# Patient Record
Sex: MALE | Race: WHITE | Employment: FULL TIME | URBAN - METROPOLITAN AREA
[De-identification: names, ages, dates, MRNs, and addresses within clinical notes are randomized per-mention and may not be internally consistent; named-entity substitution may affect disease eponyms.]

---

## 2019-05-18 ENCOUNTER — APPOINTMENT (OUTPATIENT)
Dept: GENERAL RADIOLOGY | Age: 24
End: 2019-05-18
Payer: COMMERCIAL

## 2019-05-18 ENCOUNTER — HOSPITAL ENCOUNTER (EMERGENCY)
Age: 24
Discharge: HOME | End: 2019-05-18
Attending: EMERGENCY MEDICINE
Payer: COMMERCIAL

## 2019-05-18 VITALS
HEART RATE: 68 BPM | RESPIRATION RATE: 18 BRPM | HEIGHT: 68 IN | TEMPERATURE: 98.7 F | DIASTOLIC BLOOD PRESSURE: 76 MMHG | WEIGHT: 155 LBS | OXYGEN SATURATION: 100 % | SYSTOLIC BLOOD PRESSURE: 131 MMHG | BODY MASS INDEX: 23.49 KG/M2

## 2019-05-18 DIAGNOSIS — R55 VASOVAGAL SYNCOPE: Primary | ICD-10-CM

## 2019-05-18 LAB
A/G RATIO: 1.4 (ref 1.1–2.2)
ALBUMIN SERPL-MCNC: 4.3 G/DL (ref 3.4–5)
ALP BLD-CCNC: 111 U/L (ref 40–129)
ALT SERPL-CCNC: <5 U/L (ref 10–40)
ANION GAP SERPL CALCULATED.3IONS-SCNC: 13 MMOL/L (ref 3–16)
AST SERPL-CCNC: 24 U/L (ref 15–37)
BASOPHILS ABSOLUTE: 0 K/UL (ref 0–0.2)
BASOPHILS RELATIVE PERCENT: 0.3 %
BILIRUB SERPL-MCNC: <0.2 MG/DL (ref 0–1)
BUN BLDV-MCNC: 8 MG/DL (ref 7–20)
CALCIUM SERPL-MCNC: 9 MG/DL (ref 8.3–10.6)
CHLORIDE BLD-SCNC: 99 MMOL/L (ref 99–110)
CO2: 26 MMOL/L (ref 21–32)
CREAT SERPL-MCNC: 0.8 MG/DL (ref 0.9–1.3)
EOSINOPHILS ABSOLUTE: 1 K/UL (ref 0–0.6)
EOSINOPHILS RELATIVE PERCENT: 9.4 %
GFR AFRICAN AMERICAN: >60
GFR NON-AFRICAN AMERICAN: >60
GLOBULIN: 3 G/DL
GLUCOSE BLD-MCNC: 135 MG/DL (ref 70–99)
HCT VFR BLD CALC: 43.6 % (ref 40.5–52.5)
HEMOGLOBIN: 15.4 G/DL (ref 13.5–17.5)
LYMPHOCYTES ABSOLUTE: 1.3 K/UL (ref 1–5.1)
LYMPHOCYTES RELATIVE PERCENT: 12.2 %
MCH RBC QN AUTO: 31 PG (ref 26–34)
MCHC RBC AUTO-ENTMCNC: 35.4 G/DL (ref 31–36)
MCV RBC AUTO: 87.6 FL (ref 80–100)
MONOCYTES ABSOLUTE: 0.7 K/UL (ref 0–1.3)
MONOCYTES RELATIVE PERCENT: 6.8 %
NEUTROPHILS ABSOLUTE: 7.8 K/UL (ref 1.7–7.7)
NEUTROPHILS RELATIVE PERCENT: 71.3 %
PDW BLD-RTO: 12.6 % (ref 12.4–15.4)
PLATELET # BLD: 332 K/UL (ref 135–450)
PMV BLD AUTO: 8.3 FL (ref 5–10.5)
POTASSIUM REFLEX MAGNESIUM: 4 MMOL/L (ref 3.5–5.1)
RBC # BLD: 4.97 M/UL (ref 4.2–5.9)
SODIUM BLD-SCNC: 138 MMOL/L (ref 136–145)
TOTAL PROTEIN: 7.3 G/DL (ref 6.4–8.2)
TROPONIN: <0.01 NG/ML
WBC # BLD: 11 K/UL (ref 4–11)

## 2019-05-18 PROCEDURE — 84484 ASSAY OF TROPONIN QUANT: CPT

## 2019-05-18 PROCEDURE — 93005 ELECTROCARDIOGRAM TRACING: CPT | Performed by: PHYSICIAN ASSISTANT

## 2019-05-18 PROCEDURE — 71046 X-RAY EXAM CHEST 2 VIEWS: CPT

## 2019-05-18 PROCEDURE — 85025 COMPLETE CBC W/AUTO DIFF WBC: CPT

## 2019-05-18 PROCEDURE — 80053 COMPREHEN METABOLIC PANEL: CPT

## 2019-05-18 PROCEDURE — 99284 EMERGENCY DEPT VISIT MOD MDM: CPT

## 2019-05-18 RX ORDER — HYDROXYZINE HYDROCHLORIDE 25 MG/1
25 TABLET, FILM COATED ORAL NIGHTLY
COMMUNITY

## 2019-05-18 ASSESSMENT — ENCOUNTER SYMPTOMS
NAUSEA: 0
SHORTNESS OF BREATH: 0
CHEST TIGHTNESS: 0
SORE THROAT: 0
SINUS PRESSURE: 0
COUGH: 0
ABDOMINAL PAIN: 0
VOMITING: 0

## 2019-05-18 NOTE — ED NOTES
Bed: 515  Expected date:   Expected time:   Means of arrival:   Comments:  Hans Rajput RN  05/18/19 [id number]

## 2019-05-18 NOTE — ED PROVIDER NOTES
[address] ENCOUNTER          PHYSICIAN ASSISTANT NOTE       Date of evaluation: 2019    Chief Complaint     Loss of Consciousness      History of Present Illness     Todd Andre is a 21 y.o. male with a history of anxiety, asthma, and eczema presents today with a syncopal episode. Patient states she was sitting on the couch watching when he began having an aching pain in his left upper quadrant. He initially thought it was gas pains. As he was walking he began feeling sweaty and turned \"white as a sheet\". He went to tell his mom he was feeling unwell and apparently when he got into her bedroom nearly slumped to the floor. His mom lowered him to the ground he did not strike his head. During this episode he does not endorse any chest pain, shortness of breath. He states he felt tingling along the left side of his body and was very anxious about the situation. He has a history of anxiety attacks but never  symptoms like this. They called EMS and when EMS arrived he states he had felt much improved, but when he went to stand he felt like he was about to pass out again. He denies family history of early age heart attacks or cardiac issues. He is an active young man and has never passed out during exercise or activity. He does state he was treated with IM Decadron, anabolic eyedrops, and oral antibiotics for a right eye infection yesterday. He states his eye is no longer swollen, does not hurt, does not endorse any discharge. He denies any headache, difficulty moving his eye, fever, chills, sweats. Review of Systems     Review of Systems   Constitutional: Positive for chills. Negative for fever. HENT: Negative for sinus pressure and sore throat. Eyes: Negative for visual disturbance. Respiratory: Negative for cough, chest tightness and shortness of breath. Cardiovascular: Negative for chest pain and palpitations.    Gastrointestinal: Negative for abdominal pain, nausea and vomiting. Genitourinary: Negative for dysuria, flank pain and hematuria. Musculoskeletal: Negative for myalgias and neck pain. Skin: Negative for rash. Neurological: Positive for syncope and numbness. Negative for dizziness, light-headedness and headaches. Psychiatric/Behavioral: The patient is not nervous/anxious. Past Medical, Surgical, Family, and Social History     He has no past medical history on file. He has a past surgical history that includes fracture surgery and hernia repair. His family history is not on file. He reports that he has never smoked. He does not have any smokeless tobacco history on file. He reports that he drinks alcohol. He reports that he does not use drugs. Medications     Discharge Medication List as of 5/18/2019  9:09 PM      CONTINUE these medications which have NOT CHANGED    Details   hydrOXYzine (ATARAX) 25 MG tablet Take 25 mg by mouth nightly Two tabs Historical Med             Allergies     He has No Known Allergies. Physical Exam     INITIAL VITALS: BP: 131/76, Temp: 98.7 °F (37.1 °C), Pulse: 68, Resp: 18, SpO2: 100 %     General: Well appearing, well nourished, in no apparent state of distress. HEENT:  Normocephalic, atraumatic. Pupils equal, sclera white. Handling secretions without difficulty. EOM grossly intact. Neck: No meningismus. Trachea midline    Pulmonary: Respirations even. Non labored. No tachypnea. Good air movement throughout    Cardiac: Chest symmetrical and non-tender on palpation of chest wall. RRR. no M/R/G. Abdomen:  Non-distended. Bowel sounds present in all quadrants. Non rigid and non tender to palpation. Musculoskeletal:  Ambulates under own control. Atraumatic exam with no focal swelling or tenderness. No peripheral edema. Strength 5/5 in all four extremities. Neuro: A&O x4. GCS 15. CN II-XII grossly intact. Sensation to light and sharp touch intact throughout. Normal unlabored gait. reflex Magnesium 4.0 3.5 - 5.1 mmol/L    Chloride 99 99 - 110 mmol/L    CO2 26 21 - 32 mmol/L    Anion Gap 13 3 - 16    Glucose 135 (H) 70 - 99 mg/dL    BUN 8 7 - 20 mg/dL    CREATININE 0.8 (L) 0.9 - 1.3 mg/dL    GFR Non-African American >60 >60    GFR African American >60 >60    Calcium 9.0 8.3 - 10.6 mg/dL    Total Protein 7.3 6.4 - 8.2 g/dL    Alb 4.3 3.4 - 5.0 g/dL    Albumin/Globulin Ratio 1.4 1.1 - 2.2    Total Bilirubin <0.2 0.0 - 1.0 mg/dL    Alkaline Phosphatase 111 40 - 129 U/L    ALT <5 (L) 10 - 40 U/L    AST 24 15 - 37 U/L    Globulin 3.0 g/dL   Troponin   Result Value Ref Range    Troponin <0.01 <0.01 ng/mL   EKG 12 Lead   Result Value Ref Range    Ventricular Rate 66 BPM    Atrial Rate 66 BPM    P-R Interval 116 ms    QRS Duration 98 ms    Q-T Interval 402 ms    QTc Calculation (Bazett) 421 ms    P Axis 20 degrees    R Axis 70 degrees    T Axis 46 degrees    Diagnosis       EKG performed in ER and to be interpreted by ER physician. Confirmed by MD, ER [id number]),  [address] ([phone]) on 5/19/2019 8:59:29 AM       ED BEDSIDE ULTRASOUND:      RECENT VITALS:  BP: 131/76, Temp: 98.7 °F (37.1 °C), Pulse: 68, Resp: 18, SpO2: 100 %     Procedures         ED Course     Nursing Notes, Past Medical Hx, Past Surgical Hx, Social Hx, Allergies, and Family Hx were reviewed. The patient was given the following medications:  No orders of the defined types were placed in this encounter. CONSULTS:  IP CONSULT TO PRIMARY CARE PROVIDER    MEDICAL DECISION MAKING / ASSESSMENT / Luli Lyle is a 21 y.o. male with a history of asthma, eczema presents today after a syncopal episode. It appears vagovagal in nature as he describes gas pains, sensation vascular the bathroom, and while walking to the bathroom became cold, clammy, pale, and eventually passed out with his mother lowering him to the floor. He did not strike his head.   There were no preceding chest pain episodes or shortness of breath or palpitations. On my examination he appears well. His abdomen is no longer hurting and is benign. His orthostatics are unremarkable. No carotid bruits on exam.  CMP unremarkable. Troponin not elevated. EKG did reveal left ventricular hypertrophy, but my suspicion for hypertrophy cardiomyopathy is low concern he has played sports his entire life and has never had an exercise induced syncopal episode. I believe he is safe for discharge and follow-up with an outpatient echocardiogram.  He was given explicit return precautions and felt comfortable with this plan. This patient was also evaluated by the attending physician. All care plans were discussed and agreed upon. Clinical Impression     1.  Vasovagal syncope        Disposition     PATIENT REFERRED TO:  Triston Godinez MD  [address] [facility] [address]  [address] [facility] [address]  [phone]    Call in 2 days  to schedule an appointment      DISCHARGE MEDICATIONS:  Discharge Medication List as of 5/18/2019  9:09 PM          DISPOSITION Decision To Discharge 05/18/2019 08:32:27 PM       Aly Sinclair PA-C  05/19/19 [id number]

## 2019-05-19 LAB
EKG ATRIAL RATE: 66 BPM
EKG DIAGNOSIS: NORMAL
EKG P AXIS: 20 DEGREES
EKG P-R INTERVAL: 116 MS
EKG Q-T INTERVAL: 402 MS
EKG QRS DURATION: 98 MS
EKG QTC CALCULATION (BAZETT): 421 MS
EKG R AXIS: 70 DEGREES
EKG T AXIS: 46 DEGREES
EKG VENTRICULAR RATE: 66 BPM

## 2019-05-19 NOTE — ED NOTES
Patient prepared for and ready to be discharged. Patient discharged at this time in no acute distress after verbalizing understanding of discharge instructions. Patient left after receiving After Visit Summary instructions.       Iam Donnelly RN  05/18/19 [id number]

## 2020-08-03 ENCOUNTER — HOSPITAL ENCOUNTER (EMERGENCY)
Age: 25
Discharge: HOME | End: 2020-08-03
Attending: EMERGENCY MEDICINE

## 2020-08-03 ENCOUNTER — NURSE TRIAGE (OUTPATIENT)
Dept: OTHER | Facility: CLINIC | Age: 25
End: 2020-08-03

## 2020-08-03 VITALS
RESPIRATION RATE: 16 BRPM | OXYGEN SATURATION: 97 % | SYSTOLIC BLOOD PRESSURE: 128 MMHG | DIASTOLIC BLOOD PRESSURE: 72 MMHG | TEMPERATURE: 98.1 F | HEART RATE: 76 BPM

## 2020-08-03 PROCEDURE — 6370000000 HC RX 637 (ALT 250 FOR IP): Performed by: EMERGENCY MEDICINE

## 2020-08-03 PROCEDURE — 99282 EMERGENCY DEPT VISIT SF MDM: CPT

## 2020-08-03 RX ORDER — PREDNISONE 20 MG/1
40 TABLET ORAL ONCE
Status: COMPLETED | OUTPATIENT
Start: 2020-08-03 | End: 2020-08-03

## 2020-08-03 RX ORDER — PREDNISONE 20 MG/1
40 TABLET ORAL DAILY
Qty: 4 TABLET | Refills: 0 | Status: SHIPPED | OUTPATIENT
Start: 2020-08-03 | End: 2020-08-05

## 2020-08-03 RX ADMIN — PREDNISONE 40 MG: 20 TABLET ORAL at 02:16

## 2020-08-03 ASSESSMENT — ENCOUNTER SYMPTOMS
RESPIRATORY NEGATIVE: 1
EYES NEGATIVE: 1
GASTROINTESTINAL NEGATIVE: 1

## 2020-08-03 NOTE — ED PROVIDER NOTES
[address] [facility]  EMERGENCY DEPARTMENT ENCOUNTER          ATTENDING PHYSICIAN NOTE       Date of evaluation: 8/3/2020    Chief Complaint     Urticaria      History of Present Illness     Vitaly Mathew is a 25 y.o. male who presents to the emergency department complaining of rash. Patient states that earlier this evening he noticed a rash present on the right side of his back. He states that spreads across the back and onto the legs and arms. He states he tried taking Benadryl and then hydroxyzine at home without improvement of symptoms so came to the emergency department for evaluation. Patient denies any difficulty breathing or swallowing. He denies any new soaps, shampoos, dishwashing detergents, foods, or medications. He does state that his mother washed his closed and a new laundry detergent, but states he has used this laundry detergent in the past without difficulty. Review of Systems     Review of Systems   Constitutional: Negative. HENT: Negative. Eyes: Negative. Respiratory: Negative. Cardiovascular: Negative. Gastrointestinal: Negative. Genitourinary: Negative. Musculoskeletal: Negative. Skin: Positive for rash. Neurological: Negative. All other systems reviewed and are negative. Past Medical, Surgical, Family, and Social History     He has no past medical history on file. He has a past surgical history that includes fracture surgery and hernia repair. His family history is not on file. He reports that he has never smoked. He has never used smokeless tobacco. He reports current alcohol use. He reports that he does not use drugs. Medications     Previous Medications    HYDROXYZINE (ATARAX) 25 MG TABLET    Take 25 mg by mouth nightly Two tabs        Allergies     He is allergic to peanut oil. Physical Exam     INITIAL VITALS: BP: (!) 166/74, Temp: 98.1 °F (36.7 °C), Pulse: 76, Resp: 16, SpO2: 97 %   Physical Exam  Vitals signs and nursing note reviewed. Constitutional:       General: He is not in acute distress. HENT:      Head: Normocephalic and atraumatic. Mouth/Throat:      Mouth: Mucous membranes are moist.      Pharynx: No oropharyngeal exudate. Cardiovascular:      Rate and Rhythm: Normal rate and regular rhythm. Pulmonary:      Effort: Pulmonary effort is normal.      Breath sounds: Normal breath sounds. No wheezing, rhonchi or rales. Skin:     Findings: Rash present. Rash is urticarial.      Comments: Urticarial rash present predominately to the back of the torso as well as back of the legs and arms. Neurological:      Mental Status: He is alert. Diagnostic Results     RECENT VITALS:  BP: 128/72,Temp: 98.1 °F (36.7 °C), Pulse: 76, Resp: 16, SpO2: 97 %     Procedures     N/A    ED Course     Nursing Notes, Past Medical Hx, Past Surgical Hx, Social Hx,Allergies, and Family Hx were reviewed. patient was given the following medications:  Orders Placed This Encounter   Medications    predniSONE (DELTASONE) tablet 40 mg    predniSONE (DELTASONE) 20 MG tablet     Sig: Take 2 tablets by mouth daily for 2 days     Dispense:  4 tablet     Refill:  0       CONSULTS:  None    MEDICAL DECISIONMAKING / ASSESSMENT / Igor Torres is a 25 y.o. male presents for rash. Patient does not note any instigating factors to the rash. He states it started him back and does have urticaria to this area as well as spread urticaria to the arms and legs. He has no evidence of oropharyngeal mucosal involvement. He has clear breath sounds bilaterally. Patient symptoms are likely secondary to an allergic reaction though it is unclear as to the trigger. Patient will be placed on steroids since he is not responding well to antihistamines. He will be instructed to follow-up with his primary care provider and allergist if his symptoms continue. Clinical Impression     1.  Urticaria        Disposition     PATIENT REFERRED TO:  Rodger Portillo MD  [address]  [facility] [address] [place] [id number]  [phone]            DISCHARGE MEDICATIONS:  New Prescriptions    PREDNISONE (DELTASONE) 20 MG TABLET    Take 2 tablets by mouth daily for 2 days       DISPOSITION Decision To Discharge 08/03/2020 02:07:32 AM        Mirta Charles MD  08/03/20 [id number]

## 2020-08-03 NOTE — LETTER
[facility] Emergency Department  [facility] [address] [address]  Phone: [phone]  Fax: [phone]               August 3, 2020    Patient: Taya Kapadia   YOB: 1995   Date of Visit: 8/3/2020       To Whom It May Concern:    Taya Kapadia was seen and treated in our emergency department on 8/3/2020. He may return to work on 8/4/20.       Sincerely,       Anthony Limon MD         Signature:__________________________________

## 2020-08-03 NOTE — ED NOTES
Patient presents to ED with complaints of hives. He state she got off work around 1800 and noticed hives on his back. He states his mom told him she washed his clothes with a different detergent, but one she has used before. Patient also has hives on his upper arms. Patient took benadryl without much relief. Patient denies shortness of breath, denies oral swelling.       Renee Guevara RN  08/03/20 [id number]